# Patient Record
Sex: FEMALE | Race: OTHER
[De-identification: names, ages, dates, MRNs, and addresses within clinical notes are randomized per-mention and may not be internally consistent; named-entity substitution may affect disease eponyms.]

---

## 2017-01-08 VITALS — DIASTOLIC BLOOD PRESSURE: 50 MMHG | SYSTOLIC BLOOD PRESSURE: 88 MMHG

## 2017-09-07 ENCOUNTER — HOSPITAL ENCOUNTER (EMERGENCY)
Dept: HOSPITAL 61 - ER | Age: 37
Discharge: HOME | End: 2017-09-07
Payer: MEDICAID

## 2017-09-07 VITALS — HEIGHT: 64 IN | BODY MASS INDEX: 24.75 KG/M2 | WEIGHT: 145 LBS
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
## 2017-09-07 DIAGNOSIS — M25.562: ICD-10-CM
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
## 2017-09-07 DIAGNOSIS — M25.552: Primary | ICD-10-CM

## 2017-09-07 PROCEDURE — 99284 EMERGENCY DEPT VISIT MOD MDM: CPT

## 2017-09-07 PROCEDURE — 73630 X-RAY EXAM OF FOOT: CPT

## 2017-09-07 PROCEDURE — 73502 X-RAY EXAM HIP UNI 2-3 VIEWS: CPT

## 2017-09-07 NOTE — RAD
Pelvis with left hip, 3 views, 9/7/2017:



History: Fall, pain



No fracture or dislocation is identified. The soft tissues are unremarkable.



IMPRESSION: No acute abnormality is detected.







Left foot, 3 views, 9/7/2017:



History: Fall, pain



No fracture or dislocation is identified. There is mild subcutaneous edema.



IMPRESSION: No acute bony abnormality is detected.

## 2017-09-07 NOTE — PHYS DOC
Past Medical History


Past Medical History:  No Pertinent History


Past Surgical History:  Cholecystectomy


Alcohol Use:  None


Drug Use:  None





Adult General


Chief Complaint


Chief Complaint:  MECHANICAL FALL





HPI


HPI





Patient is a 37  year old female presents to the emergency department stating 

that she was walking with her child when she slipped and fell on the water. She 

states she is having left hip and left knee pain and discomfort. She is able to 

ambulate with a good steady gait. She denies any back pain or discomfort. She 

denies hitting her head or any loss of consciousness.





Review of Systems


Review of Systems





Constitutional: Denies fever or chills []


Eyes: Denies change in visual acuity, redness, or eye pain []


HENT: Denies nasal congestion or sore throat []


Respiratory: Denies cough or shortness of breath []


Cardiovascular: No additional information not addressed in HPI []


GI: Denies abdominal pain, nausea, vomiting, bloody stools or diarrhea []


: Denies dysuria or hematuria []


Musculoskeletal: Denies back pain. Complaint of left hip and left knee pain


Integument: Denies rash or skin lesions []


Neurologic: Denies headache, focal weakness or sensory changes []


Endocrine: Denies polyuria or polydipsia []





Allergies


Allergies





Allergies








Coded Allergies Type Severity Reaction Last Updated Verified


 


  No Known Drug Allergies    17 No











Physical Exam


Physical Exam





Constitutional: Well developed, well nourished, no acute distress, non-toxic 

appearance. []


HENT: Normocephalic, atraumatic, bilateral external ears normal, oropharynx 

moist, no oral exudates, nose normal. []


Eyes: PERRLA, EOMI, conjunctiva normal, no discharge. [] 


Neck: Normal range of motion, no tenderness, supple, no stridor. [] 


Cardiovascular:Heart rate regular rhythm, no murmur []


Lungs & Thorax:  Bilateral breath sounds clear to auscultation []


Skin: Warm, dry, no erythema, no rash. [] 


Back: No cervical spine, thoracic spine, lumbar spine tenderness, no crepitus, 

no deformities and no step-offs noted.


Extremities: Left hip, left knee tenderness, no cyanosis, no clubbing, ROM 

intact, no edema. Left lower leg peripheral pulses 2+ cap refill brisk less 

than 2 seconds. Patient with a good steady K noted.


Neurologic: Alert and oriented X 3, normal motor function, normal sensory 

function, no focal deficits noted. []


Psychologic: Affect normal, judgement normal, mood normal. []





Current Patient Data


Vital Signs





 Vital Signs








  Date Time  Temp Pulse Resp B/P (MAP) Pulse Ox O2 Delivery O2 Flow Rate FiO2


 


17 11:40 98.8 80 20  100 Room Air  





 98.8       











EKG


EKG


[]





Radiology/Procedures


Radiology/Procedures


[][facility]


 [address] [place] [address]


 [phone]


 


 IMAGING REPORT





 Signed





PATIENT: MALIK DC ACCOUNT: [id number] MRN#: [id number]


: 1980 LOCATION: ER AGE: 37


SEX: F EXAM DT: 17 ACCESSION#: [id number]; [id number]


STATUS: REG ER ORD. PHYSICIAN: GIANLUCA SHORT 


REASON: slipped and fell with child


PROCEDURE: FOOT LEFT 3V; HIP LEFT 2V WITH PELVIS





Pelvis with left hip, 3 views, 2017:





History: Fall, pain





No fracture or dislocation is identified. The soft tissues are unremarkable.





IMPRESSION: No acute abnormality is detected.











Left foot, 3 views, 2017:





History: Fall, pain





No fracture or dislocation is identified. There is mild subcutaneous edema.





IMPRESSION: No acute bony abnormality is detected.














DICTATED and SIGNED BY:     MORITZ,RICK S MD


DATE:     17 1158





CC: GIANLUCA SHORT; NON,STAFF; JESSE LAUGHLIN Jr, MD ~





Course & Med Decision Making


Course & Med Decision Making


Pertinent Labs and Imaging studies reviewed. (See chart for details)


X-rays were negative for any bony abnormalities. Patient was encouraged to use 

Tylenol or ibuprofen for pain and discomfort. Ice packs on 20 minutes off 20 

minutes several times a day. Patient will be discharged home in stable 

condition with signs and symptoms to return back to emergency department. 

Patient agrees with discharge instructions treatment regimens and follow-up 

recommendations. All questions and concerns were answered at patient's bedside.


[]





Dragon Disclaimer


Dragon Disclaimer


This electronic medical record was generated, in whole or in part, using a 

voice recognition dictation system.





Departure


Departure


Impression:  


 Primary Impression:  


 Fall


 Additional Impressions:  


 Hip pain, left


 Knee pain, left


Disposition:  01 HOME, SELF-CARE


Condition:  STABLE


Referrals:  


JESSE LAUGHLIN Jr, MD (PCP)


Patient Instructions:  Fall Prevention and Home Safety, Easy-to-Read, Hip Pain, 

Knee Pain, Easy-to-Read





Additional Instructions:  


Activity as tolerated.


Tylenol or ibuprofen for pain and discomfort.


Ice packs on 20 minutes off 20 minutes several times a day.


Follow-up to primary care physician in the next week if he continued have pain 

and discomfort.


Return back to emergency prior signs symptoms of become worse.





Problem Qualifiers








 Primary Impression:  


 Fall


 Encounter type:  initial encounter  Qualified Codes:  W19.XXXA - Unspecified 

fall, initial encounter


 Additional Impressions:  


 Knee pain, left


 Chronicity:  acute  Qualified Codes:  M25.562 - Pain in left knee








GIANLUCA SHORT APRN Sep 7, 2017 12:16

## 2021-04-22 ENCOUNTER — HOSPITAL ENCOUNTER (EMERGENCY)
Dept: HOSPITAL 61 - ER | Age: 41
Discharge: HOME | End: 2021-04-22
Payer: SELF-PAY

## 2021-04-22 VITALS — DIASTOLIC BLOOD PRESSURE: 62 MMHG | SYSTOLIC BLOOD PRESSURE: 106 MMHG

## 2021-04-22 VITALS — WEIGHT: 135.14 LBS | BODY MASS INDEX: 23.95 KG/M2 | HEIGHT: 63 IN

## 2021-04-22 DIAGNOSIS — F32.9: ICD-10-CM

## 2021-04-22 DIAGNOSIS — K59.00: Primary | ICD-10-CM

## 2021-04-22 DIAGNOSIS — R20.8: ICD-10-CM

## 2021-04-22 DIAGNOSIS — Z90.49: ICD-10-CM

## 2021-04-22 DIAGNOSIS — R20.2: ICD-10-CM

## 2021-04-22 PROCEDURE — 81025 URINE PREGNANCY TEST: CPT

## 2021-04-22 PROCEDURE — 99283 EMERGENCY DEPT VISIT LOW MDM: CPT

## 2021-04-22 NOTE — PHYS DOC
Past Medical History


Past Medical History:  Depression


Past Surgical History:  Cholecystectomy


Smoking Status:  Never Smoker


Alcohol Use:  None


Drug Use:  None





General Adult


EDM:


Chief Complaint:  CONTISPATION





HPI:


HPI:





Patient is a 41  year old female who no prescription medications presents with a

chief complaint of constipation.  Patient states that she has not had a bowel 

movement x3 days.  She states she has suprapubic discomfort that radiates in her

lower abdomen up to the periumbilical region.  Patient describes the pain as a 

burning-like sensation.  Patient tells me she has a past medical history of 

constipation and similar symptoms previously.  Patient did not use any home 

medications to treat her constipation.  Patient had had some nausea but has not 

vomited she denies any urinary urgency frequency and denies any vaginal 

discharge.








Patient does have an abdominal surgical history but she does not know what organ

was removed.





Review of Systems:


Review of Systems:


Constitutional:   Denies fever or chills. []


Eyes:   Denies change in visual acuity. []


HENT:   Denies nasal congestion or sore throat. [] 


Respiratory:   Denies cough or shortness of breath. [] 


Cardiovascular:   Denies chest pain or edema. [] 


GI:   Positive abdominal pain, Positive nausea, no vomiting, bloody stools or 

diarrhea. [Positive constipation] 


:  Denies dysuria. [] 


Musculoskeletal:   Denies back pain or joint pain. [] 


Integument:   Denies rash. [] 


Neurologic:   Denies headache, focal weakness or sensory changes. [] 


Endocrine:   Denies polyuria or polydipsia. [] 


Lymphatic:  Denies swollen glands. [] 


Psychiatric:  Denies depression or anxiety. []





Heart Score:


C/O Chest Pain:  N/A


Risk Factors:


Risk Factors:  DM, Current or recent (<one month) smoker, HTN, HLP, family 

history of CAD, obesity.


Risk Scores:


Score 0 - 3:  2.5% MACE over next 6 weeks - Discharge Home


Score 4 - 6:  20.3% MACE over next 6 weeks - Admit for Clinical Observation


Score 7 - 10:  72.7% MACE over next 6 weeks - Early Invasive Strategies





Current Medications:





Current Medications








 Medications


  (Trade)  Dose


 Ordered  Sig/Gabrielle  Start Time


 Stop Time Status Last Admin


Dose Admin


 


 Lorazepam


  (Ativan Inj)  1 mg  1X  ONCE  4/22/21 23:00


 4/22/21 22:21 DC  














Allergies:


Allergies:





Allergies








Coded Allergies Type Severity Reaction Last Updated Verified


 


  No Known Drug Allergies    1/6/17 No











Physical Exam:


PE:


General: alert, no acute distress.


Skin: warm, dry and intact.


Head::  Normocephalic, atraumatic.


Neck:   Trachea midline.


Eyes: EOMI, Normal conjunctiva, No drainage


CARDIOVASCULAR:  Regular rate and rhythm


RESPIRATORY:  No respiratory distress


Back:  Full range of motion.


MUSCULOSKELETAL:  Full range of motion of bilateral upper and lower extremities.


GASTROINTESTINAL: Abdomen soft without rebound or guarding.


NEUROLOGICAL:  Alert and noted to person, place and time.  No neurological 

deficits observed


Psychiatric:  Cooperative.  Normal judgment





Current Patient Data:


Labs:





                                Laboratory Tests








Test


 4/22/21


21:53


 


POC Urine HCG, Qualitative


 Hcg negative


(Negative)











EKG:


EKG:


[]





Radiology/Procedures:


Radiology/Procedures:


[]





Course & Med Decision Making:


Course & Med Decision Making


Pertinent Labs and Imaging studies reviewed. (See chart for details)





[] Based upon history of present illness and physical exam no emergent lab or 

radiologic imaging was performed.  Patient's abdomen was soft without rebound or

 guarding.  Patient appeared in no acute distress.  Patient has previous history

 of constipation with similar symptoms.  Patient was discharged home with 

GoLYTELY.





Dragon Disclaimer:


Dragon Disclaimer:


This electronic medical record was generated, in whole or in part, using a voice

 recognition dictation system.





Departure


Departure


Impression:  


   Primary Impression:  


   Constipation


Disposition:  01 HOME / SELF CARE / HOMELESS


Condition:  STABLE


Referrals:  


UNKNOWN PCP NAME (PCP)


Patient Instructions:  Constipation, Adult


Scripts


Peg 3350/Na Sulf,Bicarb,Cl/Kcl (GOLYTELY SOLUTION) 4,000 Ml Soln.recon


4000 ML PO 1X for 1 Day, #1 MISC


   drink 8 oz q 10 minutes until stool


   Prov: TONIA NAVARRO DO         4/22/21











TONIA NAVARRO DO            Apr 22, 2021 22:54